# Patient Record
Sex: MALE | Race: OTHER | NOT HISPANIC OR LATINO | Employment: FULL TIME | URBAN - METROPOLITAN AREA
[De-identification: names, ages, dates, MRNs, and addresses within clinical notes are randomized per-mention and may not be internally consistent; named-entity substitution may affect disease eponyms.]

---

## 2023-03-29 ENCOUNTER — OFFICE VISIT (OUTPATIENT)
Dept: PRIMARY CARE CLINIC | Facility: CLINIC | Age: 38
End: 2023-03-29
Payer: COMMERCIAL

## 2023-03-29 ENCOUNTER — LAB VISIT (OUTPATIENT)
Dept: LAB | Facility: HOSPITAL | Age: 38
End: 2023-03-29
Attending: INTERNAL MEDICINE
Payer: COMMERCIAL

## 2023-03-29 ENCOUNTER — TELEPHONE (OUTPATIENT)
Dept: PRIMARY CARE CLINIC | Facility: CLINIC | Age: 38
End: 2023-03-29

## 2023-03-29 VITALS
DIASTOLIC BLOOD PRESSURE: 86 MMHG | HEIGHT: 75 IN | RESPIRATION RATE: 18 BRPM | OXYGEN SATURATION: 99 % | BODY MASS INDEX: 24.28 KG/M2 | HEART RATE: 79 BPM | WEIGHT: 195.31 LBS | SYSTOLIC BLOOD PRESSURE: 112 MMHG

## 2023-03-29 DIAGNOSIS — E78.2 MIXED HYPERLIPIDEMIA: ICD-10-CM

## 2023-03-29 DIAGNOSIS — Z78.9 ALCOHOL USE: ICD-10-CM

## 2023-03-29 DIAGNOSIS — Z00.00 PREVENTATIVE HEALTH CARE: ICD-10-CM

## 2023-03-29 DIAGNOSIS — E55.9 VITAMIN D DEFICIENCY: Primary | ICD-10-CM

## 2023-03-29 DIAGNOSIS — D22.9 NUMEROUS SKIN MOLES: ICD-10-CM

## 2023-03-29 DIAGNOSIS — E55.9 VITAMIN D DEFICIENCY: ICD-10-CM

## 2023-03-29 DIAGNOSIS — B35.1 ONYCHOMYCOSIS OF GREAT TOE: ICD-10-CM

## 2023-03-29 DIAGNOSIS — Z12.83 SCREENING FOR SKIN CANCER: ICD-10-CM

## 2023-03-29 DIAGNOSIS — E78.2 MIXED HYPERLIPIDEMIA: Primary | ICD-10-CM

## 2023-03-29 DIAGNOSIS — L50.9 HIVES: ICD-10-CM

## 2023-03-29 LAB
25(OH)D3+25(OH)D2 SERPL-MCNC: 24 NG/ML (ref 30–96)
ALBUMIN SERPL BCP-MCNC: 4.5 G/DL (ref 3.5–5.2)
ALP SERPL-CCNC: 60 U/L (ref 55–135)
ALT SERPL W/O P-5'-P-CCNC: 35 U/L (ref 10–44)
ANION GAP SERPL CALC-SCNC: 12 MMOL/L (ref 8–16)
AST SERPL-CCNC: 26 U/L (ref 10–40)
BASOPHILS # BLD AUTO: 0.03 K/UL (ref 0–0.2)
BASOPHILS NFR BLD: 0.5 % (ref 0–1.9)
BILIRUB SERPL-MCNC: 0.9 MG/DL (ref 0.1–1)
BUN SERPL-MCNC: 16 MG/DL (ref 6–20)
CALCIUM SERPL-MCNC: 9.9 MG/DL (ref 8.7–10.5)
CHLORIDE SERPL-SCNC: 102 MMOL/L (ref 95–110)
CHOLEST SERPL-MCNC: 292 MG/DL (ref 120–199)
CHOLEST/HDLC SERPL: 4.9 {RATIO} (ref 2–5)
CO2 SERPL-SCNC: 25 MMOL/L (ref 23–29)
CREAT SERPL-MCNC: 1.1 MG/DL (ref 0.5–1.4)
DIFFERENTIAL METHOD: ABNORMAL
EOSINOPHIL # BLD AUTO: 0.3 K/UL (ref 0–0.5)
EOSINOPHIL NFR BLD: 4.3 % (ref 0–8)
ERYTHROCYTE [DISTWIDTH] IN BLOOD BY AUTOMATED COUNT: 13.9 % (ref 11.5–14.5)
EST. GFR  (NO RACE VARIABLE): >60 ML/MIN/1.73 M^2
ESTIMATED AVG GLUCOSE: 108 MG/DL (ref 68–131)
GGT SERPL-CCNC: 90 U/L (ref 8–55)
GLUCOSE SERPL-MCNC: 102 MG/DL (ref 70–110)
HBA1C MFR BLD: 5.4 % (ref 4–5.6)
HCT VFR BLD AUTO: 49.9 % (ref 40–54)
HCV AB SERPL QL IA: NORMAL
HDLC SERPL-MCNC: 60 MG/DL (ref 40–75)
HDLC SERPL: 20.5 % (ref 20–50)
HGB BLD-MCNC: 15.4 G/DL (ref 14–18)
HIV 1+2 AB+HIV1 P24 AG SERPL QL IA: NORMAL
IMM GRANULOCYTES # BLD AUTO: 0.03 K/UL (ref 0–0.04)
IMM GRANULOCYTES NFR BLD AUTO: 0.5 % (ref 0–0.5)
LDLC SERPL CALC-MCNC: 197.6 MG/DL (ref 63–159)
LYMPHOCYTES # BLD AUTO: 2.7 K/UL (ref 1–4.8)
LYMPHOCYTES NFR BLD: 41.4 % (ref 18–48)
MCH RBC QN AUTO: 25.9 PG (ref 27–31)
MCHC RBC AUTO-ENTMCNC: 30.9 G/DL (ref 32–36)
MCV RBC AUTO: 84 FL (ref 82–98)
MONOCYTES # BLD AUTO: 0.7 K/UL (ref 0.3–1)
MONOCYTES NFR BLD: 10.7 % (ref 4–15)
NEUTROPHILS # BLD AUTO: 2.8 K/UL (ref 1.8–7.7)
NEUTROPHILS NFR BLD: 42.6 % (ref 38–73)
NONHDLC SERPL-MCNC: 232 MG/DL
NRBC BLD-RTO: 0 /100 WBC
PLATELET # BLD AUTO: 300 K/UL (ref 150–450)
PMV BLD AUTO: 10.3 FL (ref 9.2–12.9)
POTASSIUM SERPL-SCNC: 4.3 MMOL/L (ref 3.5–5.1)
PROT SERPL-MCNC: 7.9 G/DL (ref 6–8.4)
RBC # BLD AUTO: 5.95 M/UL (ref 4.6–6.2)
SODIUM SERPL-SCNC: 139 MMOL/L (ref 136–145)
TRIGL SERPL-MCNC: 172 MG/DL (ref 30–150)
WBC # BLD AUTO: 6.47 K/UL (ref 3.9–12.7)

## 2023-03-29 PROCEDURE — 3074F SYST BP LT 130 MM HG: CPT | Mod: CPTII,S$GLB,, | Performed by: INTERNAL MEDICINE

## 2023-03-29 PROCEDURE — 87389 HIV-1 AG W/HIV-1&-2 AB AG IA: CPT | Performed by: INTERNAL MEDICINE

## 2023-03-29 PROCEDURE — 80061 LIPID PANEL: CPT | Performed by: INTERNAL MEDICINE

## 2023-03-29 PROCEDURE — 3008F BODY MASS INDEX DOCD: CPT | Mod: CPTII,S$GLB,, | Performed by: INTERNAL MEDICINE

## 2023-03-29 PROCEDURE — 80053 COMPREHEN METABOLIC PANEL: CPT | Performed by: INTERNAL MEDICINE

## 2023-03-29 PROCEDURE — 99999 PR PBB SHADOW E&M-NEW PATIENT-LVL V: CPT | Mod: PBBFAC,,, | Performed by: INTERNAL MEDICINE

## 2023-03-29 PROCEDURE — 82306 VITAMIN D 25 HYDROXY: CPT | Performed by: INTERNAL MEDICINE

## 2023-03-29 PROCEDURE — 82977 ASSAY OF GGT: CPT | Performed by: INTERNAL MEDICINE

## 2023-03-29 PROCEDURE — 86803 HEPATITIS C AB TEST: CPT | Performed by: INTERNAL MEDICINE

## 2023-03-29 PROCEDURE — 3079F DIAST BP 80-89 MM HG: CPT | Mod: CPTII,S$GLB,, | Performed by: INTERNAL MEDICINE

## 2023-03-29 PROCEDURE — 83036 HEMOGLOBIN GLYCOSYLATED A1C: CPT | Performed by: INTERNAL MEDICINE

## 2023-03-29 PROCEDURE — 36415 COLL VENOUS BLD VENIPUNCTURE: CPT | Mod: PN | Performed by: INTERNAL MEDICINE

## 2023-03-29 PROCEDURE — 85025 COMPLETE CBC W/AUTO DIFF WBC: CPT | Performed by: INTERNAL MEDICINE

## 2023-03-29 PROCEDURE — 3008F PR BODY MASS INDEX (BMI) DOCUMENTED: ICD-10-PCS | Mod: CPTII,S$GLB,, | Performed by: INTERNAL MEDICINE

## 2023-03-29 PROCEDURE — 3074F PR MOST RECENT SYSTOLIC BLOOD PRESSURE < 130 MM HG: ICD-10-PCS | Mod: CPTII,S$GLB,, | Performed by: INTERNAL MEDICINE

## 2023-03-29 PROCEDURE — 99385 PREV VISIT NEW AGE 18-39: CPT | Mod: S$GLB,,, | Performed by: INTERNAL MEDICINE

## 2023-03-29 PROCEDURE — 3044F PR MOST RECENT HEMOGLOBIN A1C LEVEL <7.0%: ICD-10-PCS | Mod: CPTII,S$GLB,, | Performed by: INTERNAL MEDICINE

## 2023-03-29 PROCEDURE — 1159F MED LIST DOCD IN RCRD: CPT | Mod: CPTII,S$GLB,, | Performed by: INTERNAL MEDICINE

## 2023-03-29 PROCEDURE — 1159F PR MEDICATION LIST DOCUMENTED IN MEDICAL RECORD: ICD-10-PCS | Mod: CPTII,S$GLB,, | Performed by: INTERNAL MEDICINE

## 2023-03-29 PROCEDURE — 99999 PR PBB SHADOW E&M-NEW PATIENT-LVL V: ICD-10-PCS | Mod: PBBFAC,,, | Performed by: INTERNAL MEDICINE

## 2023-03-29 PROCEDURE — 3079F PR MOST RECENT DIASTOLIC BLOOD PRESSURE 80-89 MM HG: ICD-10-PCS | Mod: CPTII,S$GLB,, | Performed by: INTERNAL MEDICINE

## 2023-03-29 PROCEDURE — 3044F HG A1C LEVEL LT 7.0%: CPT | Mod: CPTII,S$GLB,, | Performed by: INTERNAL MEDICINE

## 2023-03-29 PROCEDURE — 99385 PR PREVENTIVE VISIT,NEW,18-39: ICD-10-PCS | Mod: S$GLB,,, | Performed by: INTERNAL MEDICINE

## 2023-03-29 RX ORDER — EFINACONAZOLE 100 MG/ML
1 SOLUTION TOPICAL DAILY
Qty: 8 ML | Refills: 3 | Status: SHIPPED | OUTPATIENT
Start: 2023-03-29 | End: 2023-03-29

## 2023-03-29 NOTE — ASSESSMENT & PLAN NOTE
Start healthy diet high in fiber (25-35 gm daily), low fat dairy, lean protein, low in saturated/trans fat (minimize cheese, creamy salad dressings); high in calcium/magnesium/potassium; 2.0 gm sodium diet; low in processed sugars and foods, ie  WHITE sugars, bread, pasta, rice, ice cream, cookies, cake, doughnuts  -Drink 6-8 glasses of water daily  -Moderate exercise 30 min 5 times per week or 75 min intense exercise biweekly   -Start 8-10 hour intermittent fasting diet   -Avoid eating 3 hours prior to bedtime  -Reduce alcohol to 1-2 glasses per day    Can take Zyrtec 10mg for persistent hives    Get COVID bivalent booster vaccine

## 2023-03-29 NOTE — PATIENT INSTRUCTIONS
-Start healthy diet high in fiber (25-35 gm daily), low fat dairy, lean protein, low in saturated/trans fat (minimize cheese, creamy salad dressings); high in calcium/magnesium/potassium; 2.0 gm sodium diet; low in processed sugars and foods, ie  WHITE sugars, bread, pasta, rice, ice cream, cookies, cake, doughnuts  -Drink 6-8 glasses of water daily  -Moderate exercise 30 min 5 times per week or 75 min intense exercise biweekly   -Start 8-10 hour intermittent fasting diet   -Avoid eating 3 hours prior to bedtime  -Reduce alcohol to 1-2 glasses per day    Can take Zyrtec 10mg for persistent hives    Get COVID bivalent booster vaccine

## 2023-03-29 NOTE — PROGRESS NOTES
[facility] Progress Note      Chief Complaint     Establish Care       Subjective:      History of Present Illness:  Naveen Cage is a 38 y.o. male here to establish care     Intermittent hives on left upper arm almost daily after wasp stung him 2 years ago - daily x 1 year approx 2 wks after the wasp bite; hives are now becoming more sporadic; the hives disappear without medication    Right toenail trauma 10 years ago and toenail fell off and pt now concerned about toenail fungus    HLD: not on medication     Multiple moles with healing right skin lesion on right postero-lat back that was lanced at home by his wife that is now smaller but never fully went away and not hyperpigmented and firm    Vit D def'y: takes Vit D 5000 units 2-3 times per week     Daily alcohol use x 5 years: drinks approx 5 oz whiskey, bourbon, tequila due to cultural custom; has had mild alcohol withdrawal jitteriness     SH:  to [place] with one son, 5 yrs ago; moved from [place] to [facility]; sedentary now but played basketball x 25 years and then stopped x 6 years due to work and personal;  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    Past Surgical History:  No past surgical history on file.    Allergies:  Review of patient's allergies indicates:  No Known Allergies    Home Medications:  No current outpatient medications on file.     No current facility-administered medications for this visit.        Family History:  No family history on file.    Social History:  Social History     Tobacco Use    Smoking status: Never    Smokeless tobacco: Never       Review of Systems:  Pertinent positives and negatives listed in HPI. All other systems are reviewed and are negative.    Health Maintaince :   Health Maintenance Topics with due  "status: Not Due       Topic Last Completion Date    Lipid Panel 03/29/2023           Eye: UTD  Dental: UTD    Immunizations:   Tdap: n/a.  Influenza: due .  COVID: x 2; needs bivalent booster   Hepatitis C:   Cancer Screening:      The ASCVD Risk score (Livia CAMARILLO, et al., 2019) failed to calculate for the following reasons:    The 2019 ASCVD risk score is only valid for ages 40 to 79      Objective:   /86 (BP Location: Right arm, Patient Position: Sitting, BP Method: Large (Manual))   Pulse 79   Resp 18   Ht 6' 3" (1.905 m)   Wt 88.6 kg (195 lb 5.2 oz)   SpO2 99%   BMI 24.41 kg/m²      Body mass index is 24.41 kg/m².       Physical Examination:  General: Alert and awake in no apparent distress  HEENT: Normocephalic and atraumatic; Tms WNL  Eyes:  PERRL; EOMi with anicteric sclera and clear conjunctivae  Mouth:  Oropharynx clear and without exudate; moist mucous membranes  Neck:   Cervical nodes not enlarged; supple; no bruits  Cardio:  Regular rate and rhythm with normal S1 and S2; no murmurs or rubs  Resp:  CTAB; respirations unlabored; no wheezes, crackles or rhonchi  Abdom: Soft, NTND with normoactive bowel sounds; negative HSM  Extrem: Warm and well-perfused with no clubbing, cyanosis or edema; right big toenail with black discoloration and chalkiness; left baby toe blackened nail  Skin:  No rashes; numerous brown-black moles on ears, face, torso that do not appear suspciious; brown macule with firm center approx 4mm diam right postero-lat upper back with black core  Pulses:  2+ and symmetric distally  Neuro:  AAOx3; cooperative and pleasant with no focal deficits    Laboratory:      Most Recent Data:  Lab Results   Component Value Date    WBC 6.47 03/29/2023    HGB 15.4 03/29/2023    HCT 49.9 03/29/2023     03/29/2023    CHOL 292 (H) 03/29/2023    TRIG 172 (H) 03/29/2023    HDL 60 03/29/2023    ALT 35 03/29/2023    AST 26 03/29/2023     03/29/2023    K 4.3 03/29/2023     " 03/29/2023    BUN 16 03/29/2023    CO2 25 03/29/2023    HGBA1C 5.4 03/29/2023              CBC:   WBC   Date Value Ref Range Status   03/29/2023 6.47 3.90 - 12.70 K/uL Final     Hemoglobin   Date Value Ref Range Status   03/29/2023 15.4 14.0 - 18.0 g/dL Final     Hematocrit   Date Value Ref Range Status   03/29/2023 49.9 40.0 - 54.0 % Final     Platelets   Date Value Ref Range Status   03/29/2023 300 150 - 450 K/uL Final     MCV   Date Value Ref Range Status   03/29/2023 84 82 - 98 fL Final     RDW   Date Value Ref Range Status   03/29/2023 13.9 11.5 - 14.5 % Final     BMP:   Sodium   Date Value Ref Range Status   03/29/2023 139 136 - 145 mmol/L Final     Potassium   Date Value Ref Range Status   03/29/2023 4.3 3.5 - 5.1 mmol/L Final     Chloride   Date Value Ref Range Status   03/29/2023 102 95 - 110 mmol/L Final     CO2   Date Value Ref Range Status   03/29/2023 25 23 - 29 mmol/L Final     BUN   Date Value Ref Range Status   03/29/2023 16 6 - 20 mg/dL Final     Creatinine   Date Value Ref Range Status   03/29/2023 1.1 0.5 - 1.4 mg/dL Final     Glucose   Date Value Ref Range Status   03/29/2023 102 70 - 110 mg/dL Final     Calcium   Date Value Ref Range Status   03/29/2023 9.9 8.7 - 10.5 mg/dL Final     LFTs:   Total Protein   Date Value Ref Range Status   03/29/2023 7.9 6.0 - 8.4 g/dL Final     Albumin   Date Value Ref Range Status   03/29/2023 4.5 3.5 - 5.2 g/dL Final     Total Bilirubin   Date Value Ref Range Status   03/29/2023 0.9 0.1 - 1.0 mg/dL Final     Comment:     For infants and newborns, interpretation of results should be based  on gestational age, weight and in agreement with clinical  observations.    Premature Infant recommended reference ranges:  Up to 24 hours.............<8.0 mg/dL  Up to 48 hours............<12.0 mg/dL  3-5 days..................<15.0 mg/dL  6-29 days.................<15.0 mg/dL       AST   Date Value Ref Range Status   03/29/2023 26 10 - 40 U/L Final     Alkaline Phosphatase    Date Value Ref Range Status   03/29/2023 60 55 - 135 U/L Final     ALT   Date Value Ref Range Status   03/29/2023 35 10 - 44 U/L Final     GGT   Date Value Ref Range Status   03/29/2023 90 (H) 8 - 55 U/L Final     Coags: No results found for: INR, PROTIME, PTT  FLP:      Lab Results   Component Value Date    CHOL 292 (H) 03/29/2023     Lab Results   Component Value Date    HDL 60 03/29/2023     Lab Results   Component Value Date    LDLCALC 197.6 (H) 03/29/2023     Lab Results   Component Value Date    TRIG 172 (H) 03/29/2023     Lab Results   Component Value Date    CHOLHDL 20.5 03/29/2023      DM:      Hemoglobin A1C   Date Value Ref Range Status   03/29/2023 5.4 4.0 - 5.6 % Final     Comment:     ADA Screening Guidelines:  5.7-6.4%  Consistent with prediabetes  >or=6.5%  Consistent with diabetes    High levels of fetal hemoglobin interfere with the HbA1C  assay. Heterozygous hemoglobin variants (HbS, HgC, etc)do  not significantly interfere with this assay.   However, presence of multiple variants may affect accuracy.       LDL Cholesterol   Date Value Ref Range Status   03/29/2023 197.6 (H) 63.0 - 159.0 mg/dL Final     Comment:     The National Cholesterol Education Program (NCEP) has set the  following guidelines (reference values) for LDL Cholesterol:  Optimal.......................<130 mg/dL  Borderline High...............130-159 mg/dL  High..........................160-189 mg/dL  Very High.....................>190 mg/dL       Creatinine   Date Value Ref Range Status   03/29/2023 1.1 0.5 - 1.4 mg/dL Final     Thyroid: No results found for: TSH, FREET4, A3PMZZN, F4KQOHZ, THYROIDAB  Anemia: No results found for: IRON, TIBC, FERRITIN, ETSYRRDP15, FOLATE  Cardiac: No results found for: TROPONINI, CKTOTAL, CKMB, BNP  Urinalysis:   Color, UA   Date Value Ref Range Status   03/29/2023 Yellow Yellow, Straw, Luz Maria Final     Specific Gravity, UA   Date Value Ref Range Status   03/29/2023 1.025 1.005 - 1.030 Final      Nitrite, UA   Date Value Ref Range Status   03/29/2023 Negative Negative Final     Ketones, UA   Date Value Ref Range Status   03/29/2023 Negative Negative Final       Other Results:  EKG (my interpretation):     Radiology:  No image results found.          Assessment/Plan     Naveen Cage is a 38 y.o. male with:  1. Vitamin D deficiency  -     Vitamin D; Future; Expected date: 03/29/2023    2. Hives  Assessment & Plan:  -cont to monitor since becoming more sporadic  -can take Zyrtec 10 mg daily       3. Onychomycosis of great toe  Assessment & Plan:  Refer to podiatry  -trial of Vicks Vapor rub twice per day    Orders:  -     Discontinue: efinaconazole (JUBLIA) 10 % Munir; Apply 1 application topically once daily.  Dispense: 8 mL; Refill: 3    4. Preventative health care  Assessment & Plan:  Start healthy diet high in fiber (25-35 gm daily), low fat dairy, lean protein, low in saturated/trans fat (minimize cheese, creamy salad dressings); high in calcium/magnesium/potassium; 2.0 gm sodium diet; low in processed sugars and foods, ie  WHITE sugars, bread, pasta, rice, ice cream, cookies, cake, doughnuts  -Drink 6-8 glasses of water daily  -Moderate exercise 30 min 5 times per week or 75 min intense exercise biweekly   -Start 8-10 hour intermittent fasting diet   -Avoid eating 3 hours prior to bedtime  -Reduce alcohol to 1-2 glasses per day    Can take Zyrtec 10mg for persistent hives    Get COVID bivalent booster vaccine      Orders:  -     Hemoglobin A1C; Future; Expected date: 03/29/2023  -     Urinalysis; Future; Expected date: 03/29/2023  -     Comprehensive Metabolic Panel; Future; Expected date: 03/29/2023  -     CBC Auto Differential; Future; Expected date: 03/29/2023  -     Hepatitis C Antibody; Future; Expected date: 03/29/2023  -     HIV 1/2 Ag/Ab (4th Gen); Future; Expected date: 03/29/2023  -     Ambulatory referral/consult to Podiatry; Future; Expected date: 04/05/2023    5. Mixed hyperlipidemia  -      Lipid Panel; Future; Expected date: 03/29/2023    6. Alcohol use  Assessment & Plan:  Maximize alcohol 2 oz hard liquor  Check GGT, LFTs    Orders:  -     GAMMA GT; Future; Expected date: 03/29/2023    7. Screening for skin cancer  -     Ambulatory referral/consult to Dermatology; Future; Expected date: 04/05/2023    8. Numerous skin moles  Assessment & Plan:  Refer to Derm for surveillance and evaluation                I spent a total of 45 minutes on the day of the visit.This includes face to face time and non-face to face time preparing to see the patient (eg, review of tests), obtaining and/or reviewing separately obtained history, documenting clinical information in the electronic or other health record, independently interpreting results and communicating results to the patient/family/caregiver, or care coordinator.   Code Status:     Florence Moran MD

## 2023-03-30 NOTE — TELEPHONE ENCOUNTER
Good afternoon !  Pt wants to know if he can do labs in two months instead of three just because he isn't sure if he will be in [facility]

## 2023-04-03 ENCOUNTER — OFFICE VISIT (OUTPATIENT)
Dept: PODIATRY | Facility: CLINIC | Age: 38
End: 2023-04-03
Payer: COMMERCIAL

## 2023-04-03 VITALS
DIASTOLIC BLOOD PRESSURE: 89 MMHG | HEART RATE: 90 BPM | SYSTOLIC BLOOD PRESSURE: 140 MMHG | BODY MASS INDEX: 24.5 KG/M2 | HEIGHT: 75 IN | WEIGHT: 197.06 LBS

## 2023-04-03 DIAGNOSIS — B35.1 ONYCHOMYCOSIS DUE TO DERMATOPHYTE: Primary | ICD-10-CM

## 2023-04-03 DIAGNOSIS — L60.3 NAIL DYSTROPHY: ICD-10-CM

## 2023-04-03 DIAGNOSIS — Z00.00 PREVENTATIVE HEALTH CARE: ICD-10-CM

## 2023-04-03 PROCEDURE — 99999 PR PBB SHADOW E&M-EST. PATIENT-LVL IV: ICD-10-PCS | Mod: PBBFAC,,, | Performed by: PODIATRIST

## 2023-04-03 PROCEDURE — 99999 PR PBB SHADOW E&M-EST. PATIENT-LVL IV: CPT | Mod: PBBFAC,,, | Performed by: PODIATRIST

## 2023-04-03 PROCEDURE — 3077F PR MOST RECENT SYSTOLIC BLOOD PRESSURE >= 140 MM HG: ICD-10-PCS | Mod: CPTII,S$GLB,, | Performed by: PODIATRIST

## 2023-04-03 PROCEDURE — 3044F HG A1C LEVEL LT 7.0%: CPT | Mod: CPTII,S$GLB,, | Performed by: PODIATRIST

## 2023-04-03 PROCEDURE — 1159F PR MEDICATION LIST DOCUMENTED IN MEDICAL RECORD: ICD-10-PCS | Mod: CPTII,S$GLB,, | Performed by: PODIATRIST

## 2023-04-03 PROCEDURE — 1160F RVW MEDS BY RX/DR IN RCRD: CPT | Mod: CPTII,S$GLB,, | Performed by: PODIATRIST

## 2023-04-03 PROCEDURE — 99213 PR OFFICE/OUTPT VISIT, EST, LEVL III, 20-29 MIN: ICD-10-PCS | Mod: S$GLB,,, | Performed by: PODIATRIST

## 2023-04-03 PROCEDURE — 1159F MED LIST DOCD IN RCRD: CPT | Mod: CPTII,S$GLB,, | Performed by: PODIATRIST

## 2023-04-03 PROCEDURE — 1160F PR REVIEW ALL MEDS BY PRESCRIBER/CLIN PHARMACIST DOCUMENTED: ICD-10-PCS | Mod: CPTII,S$GLB,, | Performed by: PODIATRIST

## 2023-04-03 PROCEDURE — 99213 OFFICE O/P EST LOW 20 MIN: CPT | Mod: S$GLB,,, | Performed by: PODIATRIST

## 2023-04-03 PROCEDURE — 3008F BODY MASS INDEX DOCD: CPT | Mod: CPTII,S$GLB,, | Performed by: PODIATRIST

## 2023-04-03 PROCEDURE — 3079F PR MOST RECENT DIASTOLIC BLOOD PRESSURE 80-89 MM HG: ICD-10-PCS | Mod: CPTII,S$GLB,, | Performed by: PODIATRIST

## 2023-04-03 PROCEDURE — 3079F DIAST BP 80-89 MM HG: CPT | Mod: CPTII,S$GLB,, | Performed by: PODIATRIST

## 2023-04-03 PROCEDURE — 3077F SYST BP >= 140 MM HG: CPT | Mod: CPTII,S$GLB,, | Performed by: PODIATRIST

## 2023-04-03 PROCEDURE — 3044F PR MOST RECENT HEMOGLOBIN A1C LEVEL <7.0%: ICD-10-PCS | Mod: CPTII,S$GLB,, | Performed by: PODIATRIST

## 2023-04-03 PROCEDURE — 3008F PR BODY MASS INDEX (BMI) DOCUMENTED: ICD-10-PCS | Mod: CPTII,S$GLB,, | Performed by: PODIATRIST

## 2023-04-03 RX ORDER — CICLOPIROX 80 MG/ML
SOLUTION TOPICAL NIGHTLY
Qty: 6.6 ML | Refills: 6 | Status: SHIPPED | OUTPATIENT
Start: 2023-04-03

## 2023-04-03 RX ORDER — TERBINAFINE HYDROCHLORIDE 250 MG/1
250 TABLET ORAL DAILY
Qty: 30 TABLET | Refills: 1 | Status: CANCELLED | OUTPATIENT
Start: 2023-04-03 | End: 2023-06-02

## 2023-04-03 RX ORDER — TERBINAFINE HYDROCHLORIDE 250 MG/1
250 TABLET ORAL DAILY
Qty: 30 TABLET | Refills: 0 | Status: SHIPPED | OUTPATIENT
Start: 2023-04-03 | End: 2023-04-03 | Stop reason: CLARIF

## 2023-04-03 NOTE — PATIENT INSTRUCTIONS
Thick Toenails  Toenails will often become thick as an individual grows older. Thickening may also occur as a result of trauma to the toenail, such as when it repeatedly hits the end of a shoe that is too short. Sometimes when something is dropped on the toenail, the nail will fall off. When a new toenail grows back, it will often be thicker than it was previously.    Thick toenails can also be seen in individuals with nail fungus (onychomycosis), psoriasis and hypothyroidism. Those who have problems with the thickness of their toenails should consult a foot and ankle surgeon for proper diagnosis and treatment.    Nail Fungus    A fungus is an organism that lives in warm moist areas. Fungus of the toenails is a common problem that can affect people of all ages, although it most commonly affects individuals who are older.     Toenail fungus often begins as an infection in the skin called tinea pedis (also known as athletes foot). The fungus often starts under the nail fold at the end of the nail. Over time, it grows underneath the nail and causes changes to its appearance, such as a yellow or brownish discoloration. It can also cause thickening and deformity of the toenail.     Many people have difficulty with their toenails and need assistance in caring for them. A foot and ankle surgeon can diagnose the cause of toenail problems and can recommend treatments.    Yellow Toenails   The most common cause of yellow discoloration in the toenails is a fungal infection. The fungus often develops underneath the nail, resulting in it becoming thick, raised and yellow in color.    Other potential causes for yellow discoloration of the nail include diabetes mellitus and lymphedema (chronic leg swelling). Yellow staining of the nails can also occur in individuals who use nail polish. A stained nail may take several months to grow out.    White Toenails  White toenails can develop for several reasons.    Trauma, such as when an  "object is dropped on a toenail, often causes bleeding under the nail because of broken blood vessels. This would cause a black toenail. If the trauma does not cause broken blood vessels, a white spot may appear under the nail. The spot will slowly grow out with the normal growth of the toenail.    Sometimes white lines appear within the toenail. These may be caused by recurring trauma, such as when a runner wears shoes that are too small and the toe hits the end of the shoe.    White lines may also occur due to a medical illness or trauma that has occurred elsewhere in the body, causing protein to be deposited within the nail bed.    A fungal infection that affects the outermost layer of the toenail may cause a bright white discoloration of the toenail.    A white area close to the nail fold (the lunula) varies in size from one person to another. This is a normal aspect of the nail.    It is recommended that you see a foot and ankle surgeon for the diagnosis and possible treatment of white toenails.    Black Toenails  A black, purple or brownish discoloration under or involving a toenail is frequently due to trauma to the toenail, such as when something is dropped on the toe. The color results from a blood clot or bleeding under the nail and may involve the entire nail or just a small portion of it. This can be very painful when the entire nail is involved and may need medical attention to relieve the pressure caused by bleeding under the toenail.    When the second or third toenails are involved, it is commonly referred to as "runner's toe." This can be the result of the nail being slightly too long and the shoe being either too big or too tight. If the shoe is too big, when running down hill, the foot slips and the nail can get caught where the toe cap meets the toebox. If the shoes are too tight, the nail can get pinched and jammed, resulting in bleeding between the nail plate and nail bed.    Although it is very " rare, a more serious cause of black toenails is malignant melanoma. Since early diagnosis and treatment of melanoma improves the chances for a good outcome, it is important that all black toenails be evaluated by a qualified foot and ankle surgeon to rule out this cause.    Other rare causes of black toenails include fungal infections, chronic ingrown nails or health problems affecting the rest of the body.

## 2023-04-03 NOTE — PROGRESS NOTES
[facility]  [address] [place] [address] - PODIATRY [facility]  [address]  [place] [address]  Dept: [phone]  Dept Fax: [phone]    Ramez Dolan Jr., DPSLOANE     Assessment:   MDM    Coding  1. Onychomycosis due to dermatophyte  Hepatic function panel    ciclopirox (PENLAC) 8 % Soln    DISCONTINUED: terbinafine HCL (LAMISIL) 250 mg tablet      2. Nail dystrophy        3. Preventative health care  Ambulatory referral/consult to Podiatry          Plan:     Procedures  1. Onychomycosis due to dermatophyte  -     Hepatic function panel; Future; Expected date: 04/03/2023  -     Discontinue: terbinafine HCL (LAMISIL) 250 mg tablet; Take 1 tablet (250 mg total) by mouth once daily.  Dispense: 30 tablet; Refill: 0  -     ciclopirox (PENLAC) 8 % Soln; Apply topically nightly.  Dispense: 6.6 mL; Refill: 6    2. Nail dystrophy    3. Preventative health care  -     Ambulatory referral/consult to Podiatry      Naveen was seen today for foot problem.    Diagnoses and all orders for this visit:    Onychomycosis due to dermatophyte  -     Hepatic function panel; Future  -     Discontinue: terbinafine HCL (LAMISIL) 250 mg tablet; Take 1 tablet (250 mg total) by mouth once daily.  -     ciclopirox (PENLAC) 8 % Soln; Apply topically nightly.    Nail dystrophy    Preventative health care  -     Ambulatory referral/consult to Podiatry    Other orders  The following orders have not been finalized:  -     Cancel: terbinafine HCL (LAMISIL) 250 mg tablet        -pt seen, evaluated, and managed  -dx discussed in detail. All questions/concerns addressed  -all tx options discussed. All alternatives, risks, benefits of all txs discussed  -will try topical rx first  -discussed potential use of lamisil PO if topical treatment fails  -discussed PO lamisil can cause liver damage  Warned of s/s of liver damage including but not limited to nausea/vomiting that doesn't stop, loss of appetite, severe stomach/abdominal pain, yellowing  eyes/skin, dark urine.   liver injury from terbinafine use only occurs in an estimated 1 in 50,000 to 120,000 prescriptions  If liver injury does occur, it usually happens within the first 6 weeks of taking terbinafine  - labs on way out to eval liver function--> will review at nxt visit  -rxs dispensed: penlac  -referrals: none  -WB: wbat  - educated on antifungal footcare at home  - rec'd otc antifungal foot powder and spray. Use prn  - discussed nail bx in future in nonresponsive to rxd medication  -Discussed importance of keeping feet dry and changing socks and shoes on a regular basis to prevent spread of fungus      Follow up in about 6 months (around 10/3/2023).    Subjective:      Patient ID: Naveen Cage is a 38 y.o. male.    Chief Complaint:   Chief Complaint   Patient presents with    Foot Problem     Left foot        CC - fungal nails: Patient presents to the clinic complaining of thick and discolored toenails on the left foot. Patient is inquiring about treatment options.    HPI    Last Podiatry Enc: Visit date not found  Last Enc w/ Me: Visit date not found    Outside reports reviewed: historical medical records.  Family hx: as below  History reviewed. No pertinent past medical history.  History reviewed. No pertinent surgical history.  History reviewed. No pertinent family history.  Current Outpatient Medications   Medication Sig Dispense Refill    ciclopirox (PENLAC) 8 % Soln Apply topically nightly. 6.6 mL 6     No current facility-administered medications for this visit.     Review of patient's allergies indicates:  No Known Allergies  Social History     Socioeconomic History    Marital status:    Tobacco Use    Smoking status: Never    Smokeless tobacco: Never       ROS    REVIEW OF SYSTEMS: Negative as documented below as well as positive findings in bold.       Constitutional  Respiratory  Gastrointestinal  Skin   - Fever - Cough - Heartburn - Rash   - Chills - Spit blood - Nausea -  "Itching   - Weight Loss - Shortness of breath - Vomiting - Nail pain   - Malaise/Fatigue - Wheezing - Abdominal Pain  Wound/Ulcer   - Weight Gain   - Blood in Stool  Poor wound healing       - Diarrhea          Cardiovascular  Genitourinary  Neurological  HEENT   - Chest Pain - Dysuria - Burning Sensation of feet - Headache   - Palpitations - Hematuria - Tingling / Paresthesia - Congestion   - Pain at night in legs - Flank Pain - Dizziness - Sore Throat   - Cramping   - Tremor - Blurred Vision   - Leg Swelling   - Sensory Change - Double Vision   - Dizzy when standing   - Speech Change - Eye Redness       - Focal Weakness - Dry Eyes       - Loss of Consciousness          Endocrine  Musculoskeletal  Psychiatric   - Cold intolerance - Muscle Pain - Depression   - Heat intolerance - Neck Pain - Insomnia   - Anemia - Joint Pain - Memory Loss   -  Easy bruising, bleeding - Heel pain - Anxiety      Toe Pain        Leg/Ankle/Foot Pain         Objective:     BP (!) 140/89 (BP Location: Left arm, Patient Position: Sitting)   Pulse 90   Ht 6' 3" (1.905 m)   Wt 89.4 kg (197 lb 1.5 oz)   BMI 24.63 kg/m²   Vitals:    04/03/23 1411   BP: (!) 140/89   Pulse: 90   Weight: 89.4 kg (197 lb 1.5 oz)   Height: 6' 3" (1.905 m)   PainSc: 0-No pain       Physical Exam    General Appearance:   Patient appears well developed, well nourished  Patient appears stated age    Psychiatric:   Patient is oriented to time, place, and person.  Patient has appropriate mood and affect    Neck:  Trachea Midline  No visible masses    Respiratory/Ears:  No distress or labored breathing.  Able to differentiate between normal talking voice and whisper.  Able to follow commands    Eyes:  Visual Acuity intact  Lids and conjunctivae normal. No discoloration noted.    Foot Exam  Physical Exam  Ortho Exam  Ortho/SPM Exam  Physical Exam  Neurologic Exam    L LE exam con't:  V:  DP 2/4, PT 2/4   CRT< 3s to all digits tested   Tibial and popliteal lymph nodes " are w/o abnormality    edema absent, varicosities absent    N:  Patient displays normal ankle reflexes   SILT in SP/DP/T/Hung/Saph distributions    Ortho: +Motor EHL/FHL/TA/GA   No TTP  Compartments soft/compressible. No pain on passive stretch of big toe. No calf  Pain.    Derm:  skin intact, skin warm and dry, skin without ulcers or lesions, skin without induration, nails abnormal, nails discolored and nails dystrophic, mycotic x L hallux and 5th toe    Imaging / Labs:      No results found.      Note: This was dictated using a computer transcription program. Although proofread, it may contain computer transcription errors and phonetic errors. Other human proofreading errors may also exist. Corrections may be performed at a later time. Please contact us for any clarification if needed.    Ramez Dolan DPM  [facility]

## 2023-04-03 NOTE — ADDENDUM NOTE
Addended by: ROSALIA VALDEZ on: 4/3/2023 03:29 PM     Modules accepted: Orders, Level of Service

## 2023-04-05 ENCOUNTER — OFFICE VISIT (OUTPATIENT)
Dept: DERMATOLOGY | Facility: CLINIC | Age: 38
End: 2023-04-05
Payer: COMMERCIAL

## 2023-04-05 DIAGNOSIS — Z12.83 SCREENING FOR SKIN CANCER: ICD-10-CM

## 2023-04-05 DIAGNOSIS — L50.9 HIVES: Primary | ICD-10-CM

## 2023-04-05 DIAGNOSIS — D22.9 BENIGN MOLE: ICD-10-CM

## 2023-04-05 DIAGNOSIS — L90.5 SCAR: ICD-10-CM

## 2023-04-05 DIAGNOSIS — L91.0 KELOID: ICD-10-CM

## 2023-04-05 PROCEDURE — 99203 OFFICE O/P NEW LOW 30 MIN: CPT | Mod: S$GLB,,, | Performed by: DERMATOLOGY

## 2023-04-05 PROCEDURE — 3044F HG A1C LEVEL LT 7.0%: CPT | Mod: CPTII,S$GLB,, | Performed by: DERMATOLOGY

## 2023-04-05 PROCEDURE — 99999 PR PBB SHADOW E&M-EST. PATIENT-LVL III: ICD-10-PCS | Mod: PBBFAC,,, | Performed by: DERMATOLOGY

## 2023-04-05 PROCEDURE — 1159F MED LIST DOCD IN RCRD: CPT | Mod: CPTII,S$GLB,, | Performed by: DERMATOLOGY

## 2023-04-05 PROCEDURE — 3044F PR MOST RECENT HEMOGLOBIN A1C LEVEL <7.0%: ICD-10-PCS | Mod: CPTII,S$GLB,, | Performed by: DERMATOLOGY

## 2023-04-05 PROCEDURE — 1160F PR REVIEW ALL MEDS BY PRESCRIBER/CLIN PHARMACIST DOCUMENTED: ICD-10-PCS | Mod: CPTII,S$GLB,, | Performed by: DERMATOLOGY

## 2023-04-05 PROCEDURE — 99203 PR OFFICE/OUTPT VISIT, NEW, LEVL III, 30-44 MIN: ICD-10-PCS | Mod: S$GLB,,, | Performed by: DERMATOLOGY

## 2023-04-05 PROCEDURE — 1159F PR MEDICATION LIST DOCUMENTED IN MEDICAL RECORD: ICD-10-PCS | Mod: CPTII,S$GLB,, | Performed by: DERMATOLOGY

## 2023-04-05 PROCEDURE — 1160F RVW MEDS BY RX/DR IN RCRD: CPT | Mod: CPTII,S$GLB,, | Performed by: DERMATOLOGY

## 2023-04-05 PROCEDURE — 99999 PR PBB SHADOW E&M-EST. PATIENT-LVL III: CPT | Mod: PBBFAC,,, | Performed by: DERMATOLOGY

## 2023-04-05 NOTE — PATIENT INSTRUCTIONS
No hot water bathing reviewed.    Can take Claritin or Zyrtec 4-5 x day as needed.  Warned of dry mouth, difficulty urinating, hyperactivity, blurry vision, and new studies regarding dementia.

## 2023-04-05 NOTE — PROGRESS NOTES
Subjective:      Patient ID:  Naveen Cage is a 38 y.o. male who presents for   Chief Complaint   Patient presents with    Skin Check     UBSE     Patient here for Upper Body Skin Exam    Last seen by dermatologist: none    Yes, left side of neck - personal history of atypical moles removed  none - personal history of MM   none - family history of MM  none - childhood blistering sunburns  none - tanning bed use  none - personal history of NMSC    Patient with specific complaint of hive(s)  Location: Left arm  Duration: 2-3 years  Symptoms: comes and goes, itchy  Relieving factors/Previous treatments: none          Review of Systems   Constitutional:  Negative for fever and chills.   HENT:  Negative for sore throat.    Respiratory:  Negative for cough.      Objective:   Physical Exam   Constitutional: He appears well-developed and well-nourished.   Eyes: No conjunctival no injection.   Neurological: He is alert and oriented to person, place, and time.   Psychiatric: He has a normal mood and affect.   Skin:   Areas Examined (abnormalities noted in diagram):   LUE Inspection Performed               Diagram Legend     Erythematous scaling macule/papule c/w actinic keratosis       Vascular papule c/w angioma      Pigmented verrucoid papule/plaque c/w seborrheic keratosis      Yellow umbilicated papule c/w sebaceous hyperplasia      Irregularly shaped tan macule c/w lentigo     1-2 mm smooth white papules consistent with Milia      Movable subcutaneous cyst with punctum c/w epidermal inclusion cyst      Subcutaneous movable cyst c/w pilar cyst      Firm pink to brown papule c/w dermatofibroma      Pedunculated fleshy papule(s) c/w skin tag(s)      Evenly pigmented macule c/w junctional nevus     Mildly variegated pigmented, slightly irregular-bordered macule c/w mildly atypical nevus      Flesh colored to evenly pigmented papule c/w intradermal nevus       Pink pearly papule/plaque c/w basal cell carcinoma       Erythematous hyperkeratotic cursted plaque c/w SCC      Surgical scar with no sign of skin cancer recurrence      Open and closed comedones      Inflammatory papules and pustules      Verrucoid papule consistent consistent with wart     Erythematous eczematous patches and plaques     Dystrophic onycholytic nail with subungual debris c/w onychomycosis     Umbilicated papule    Erythematous-base heme-crusted tan verrucoid plaque consistent with inflamed seborrheic keratosis     Erythematous Silvery Scaling Plaque c/w Psoriasis     See annotation      Assessment / Plan:        Hives  Mild per pt.  Episodes last 30-60 min with some itch.  Can take Claritin or Zyrtec 4-5 x day as needed.  Warned of dry mouth, difficulty urinating, hyperactivity, blurry vision, and new studies regarding dementia.  Patient and or guardian to monitor this area/lesion or these areas/lesions for changes or worsening or darkening (for moles and freckles).  Patient and or guardian to contact us if any changes are noted for such.    Screening for skin cancer  -     Ambulatory referral/consult to Dermatology  Previous [facility] labs and or records and notes reviewed and considered for their impact on our clinical decision making today.    Keloid  Focal.  Offered clob but pt wishes to watch.  Discussed with patient the etiology and pathogenesis of the disease or skin lesion(s) and possible treatments and aggravators.    Chronic nature of this condition discussed with patient.    Benign mole  Discussed all of the following with the patient.    Each month, check your body for any spots such as freckles, age spots, and moles.  Watch for color changes and shape changes and growth in size.    Moles, also called nevi, are small, colored (pigmented) marks on the skin. They have no known purpose. Many moles appear before age 30, but they also increase frequently as people age. Moles most often are not cancer (benign) and are harmless. But some become  cancerous (malignant). Thats why you need to watch the moles on your body and tell your healthcare provider about any that concern you.  Brochure given for patient education.    Scar  Post neck.  Discussed getting copies of previous biopsy results and or old records.             Follow up if symptoms worsen or fail to improve.

## 2023-07-03 PROBLEM — Z00.00 PREVENTATIVE HEALTH CARE: Status: RESOLVED | Noted: 2023-03-29 | Resolved: 2023-07-03

## 2024-04-15 NOTE — TELEPHONE ENCOUNTER
Good morning !  Pt wants to know of he can come in two months instead of three cause he isn't sure if he will be in [place] in [place]     accepted